# Patient Record
Sex: MALE | Race: WHITE | NOT HISPANIC OR LATINO
[De-identification: names, ages, dates, MRNs, and addresses within clinical notes are randomized per-mention and may not be internally consistent; named-entity substitution may affect disease eponyms.]

---

## 2019-01-10 PROBLEM — Z00.00 ENCOUNTER FOR PREVENTIVE HEALTH EXAMINATION: Status: ACTIVE | Noted: 2019-01-10

## 2019-01-11 ENCOUNTER — APPOINTMENT (OUTPATIENT)
Dept: COLORECTAL SURGERY | Facility: CLINIC | Age: 49
End: 2019-01-11
Payer: COMMERCIAL

## 2019-01-11 VITALS
DIASTOLIC BLOOD PRESSURE: 82 MMHG | SYSTOLIC BLOOD PRESSURE: 147 MMHG | TEMPERATURE: 99.2 F | BODY MASS INDEX: 34.22 KG/M2 | HEIGHT: 73 IN | HEART RATE: 97 BPM | WEIGHT: 258.19 LBS

## 2019-01-11 DIAGNOSIS — Z80.42 FAMILY HISTORY OF MALIGNANT NEOPLASM OF PROSTATE: ICD-10-CM

## 2019-01-11 DIAGNOSIS — Z80.8 FAMILY HISTORY OF MALIGNANT NEOPLASM OF OTHER ORGANS OR SYSTEMS: ICD-10-CM

## 2019-01-11 DIAGNOSIS — Z82.49 FAMILY HISTORY OF ISCHEMIC HEART DISEASE AND OTHER DISEASES OF THE CIRCULATORY SYSTEM: ICD-10-CM

## 2019-01-11 DIAGNOSIS — K64.8 OTHER HEMORRHOIDS: ICD-10-CM

## 2019-01-11 DIAGNOSIS — F41.9 ANXIETY DISORDER, UNSPECIFIED: ICD-10-CM

## 2019-01-11 PROCEDURE — 46221 LIGATION OF HEMORRHOID(S): CPT

## 2019-01-11 PROCEDURE — 99202 OFFICE O/P NEW SF 15 MIN: CPT | Mod: 25

## 2019-01-11 RX ORDER — LITHIUM CARBONATE 300 MG/1
300 CAPSULE ORAL
Refills: 0 | Status: ACTIVE | COMMUNITY

## 2019-01-11 RX ORDER — VENLAFAXINE HYDROCHLORIDE 150 MG/1
150 CAPSULE, EXTENDED RELEASE ORAL
Refills: 0 | Status: ACTIVE | COMMUNITY

## 2019-01-11 RX ORDER — ARIPIPRAZOLE 2 MG/1
2 TABLET ORAL
Refills: 0 | Status: ACTIVE | COMMUNITY

## 2019-01-17 NOTE — HISTORY OF PRESENT ILLNESS
[FreeTextEntry1] : 49 yo M presents for evaluation of rectal bleeding x 2-3 months\par \par Seen by PCP, diagnosed w/ anemia, hgb 8.2, concerned for hemorrhoids. Not taking iron supplements at present.\par (+) tired with climbing stairs, denies dizziness. hx of heart palpitations when lays down for bed, has been occurring for years. Denies CP.\par \par (+) BRBPR occurs with most BMs, noted on TP and large amount in toilet bowl, has been placing tissue in underwear to absorb blood\par + constipation\par Denies itching, burning, lesions or prolapsing tissue\par BH: 2x daily, hard stools require straining.\par Reports adequate dietary fiber intake, 3-4 cups, 1 noncaffeinated soda daily.\par Denies taking OTC medications for constipation\par \par Last colonoscopy 12/2014, internal hemorrhoids noted, otherwise normal\par Denies FMH colorectal cancer\par (+) PGM bladder cancer, father prostate cancer\par Denies ASA/NSAIDs in last 7 days

## 2019-01-17 NOTE — CONSULT LETTER
[Dear  ___] : Dear  [unfilled], [Consult Letter:] : I had the pleasure of evaluating your patient, [unfilled]. [( Thank you for referring [unfilled] for consultation for _____ )] : Thank you for referring [unfilled] for consultation for [unfilled] [Consult Closing:] : Thank you very much for allowing me to participate in the care of this patient.  If you have any questions, please do not hesitate to contact me. [Sincerely,] : Sincerely, [FreeTextEntry2] : VICKY CROWLEY MD\par [address]\[place] [address] [FreeTextEntry3] : BEE HYLTON MD\par

## 2019-01-17 NOTE — PHYSICAL EXAM
[Normal] : was normal [None] : there was no rectal mass  [Excoriation] : no perianal excoriation [FreeTextEntry1] : A lighted anoscope was passed into the anal canal and the entire anal mucosal surface was inspected..  THe findings revealed large  internal hemorrhoids. No masses or lesions were identified.\par \par The risks and benefits of rubber band ligation were discussed with the patient including but not limited to bleeding, pain, infection, and the need for future procedures. The anoscope was placed and rubber band ligation was performed of the internal hemorrhoids RPQ and RAQ  with good result. The patient tolerated the procedure well. Appropriate postprocedure instructions were given to the patient.\par

## 2019-01-17 NOTE — ASSESSMENT
[FreeTextEntry1] : Advised repeat surveillance colonoscopy to assess for synchronous colorectal cancer/polyps.\par \par I had a detailed discussion with the patient regarding optimization of bowel movements with increasing daily fiber and water intake. Both synthetic and dietary fiber recommendations were reviewed. I had strongly counseled the patient regarding the need for increasing water to help improve  bowel function.\par \par I discussed with the patient that improving  bowel function will alleviate  hemorrhoidal symptoms.\par \par Advised return in 4-6 weeks if symptoms are persistent.\par